# Patient Record
Sex: MALE | Race: WHITE | NOT HISPANIC OR LATINO | Employment: FULL TIME | URBAN - METROPOLITAN AREA
[De-identification: names, ages, dates, MRNs, and addresses within clinical notes are randomized per-mention and may not be internally consistent; named-entity substitution may affect disease eponyms.]

---

## 2017-01-30 ENCOUNTER — OFFICE VISIT (OUTPATIENT)
Dept: FAMILY MEDICINE CLINIC | Facility: CLINIC | Age: 32
End: 2017-01-30

## 2017-01-30 VITALS
DIASTOLIC BLOOD PRESSURE: 70 MMHG | SYSTOLIC BLOOD PRESSURE: 110 MMHG | HEART RATE: 83 BPM | OXYGEN SATURATION: 98 % | WEIGHT: 182 LBS | HEIGHT: 74 IN | TEMPERATURE: 98 F | BODY MASS INDEX: 23.36 KG/M2

## 2017-01-30 DIAGNOSIS — F41.9 ANXIETY: ICD-10-CM

## 2017-01-30 DIAGNOSIS — Z83.79 FH: CROHN'S DISEASE: ICD-10-CM

## 2017-01-30 DIAGNOSIS — I25.10 ASCVD (ARTERIOSCLEROTIC CARDIOVASCULAR DISEASE): ICD-10-CM

## 2017-01-30 DIAGNOSIS — J30.1 NON-SEASONAL ALLERGIC RHINITIS DUE TO POLLEN: ICD-10-CM

## 2017-01-30 DIAGNOSIS — R53.83 OTHER FATIGUE: Primary | ICD-10-CM

## 2017-01-30 PROBLEM — Z00.00 PREVENTATIVE HEALTH CARE: Status: ACTIVE | Noted: 2017-01-30

## 2017-01-30 PROBLEM — J30.9 ALLERGIC RHINITIS: Status: ACTIVE | Noted: 2017-01-30

## 2017-01-30 PROBLEM — L70.0 ACNE VULGARIS: Status: ACTIVE | Noted: 2017-01-30

## 2017-01-30 PROBLEM — N39.0 UTI (URINARY TRACT INFECTION): Status: ACTIVE | Noted: 2017-01-30

## 2017-01-30 PROCEDURE — 99213 OFFICE O/P EST LOW 20 MIN: CPT | Performed by: INTERNAL MEDICINE

## 2017-01-30 RX ORDER — MULTIPLE VITAMINS W/ MINERALS TAB 9MG-400MCG
1 TAB ORAL DAILY
COMMUNITY

## 2017-01-31 ENCOUNTER — RESULTS ENCOUNTER (OUTPATIENT)
Dept: FAMILY MEDICINE CLINIC | Facility: CLINIC | Age: 32
End: 2017-01-31

## 2017-01-31 DIAGNOSIS — Z83.79 FH: CROHN'S DISEASE: ICD-10-CM

## 2017-01-31 DIAGNOSIS — F41.9 ANXIETY: ICD-10-CM

## 2017-01-31 DIAGNOSIS — I25.10 ASCVD (ARTERIOSCLEROTIC CARDIOVASCULAR DISEASE): ICD-10-CM

## 2017-01-31 DIAGNOSIS — R53.83 OTHER FATIGUE: ICD-10-CM

## 2017-01-31 DIAGNOSIS — J30.1 NON-SEASONAL ALLERGIC RHINITIS DUE TO POLLEN: ICD-10-CM

## 2017-08-28 ENCOUNTER — OFFICE VISIT (OUTPATIENT)
Dept: INTERNAL MEDICINE | Facility: CLINIC | Age: 32
End: 2017-08-28

## 2017-08-28 VITALS
HEIGHT: 74 IN | SYSTOLIC BLOOD PRESSURE: 112 MMHG | TEMPERATURE: 98.1 F | WEIGHT: 183.3 LBS | OXYGEN SATURATION: 98 % | HEART RATE: 91 BPM | DIASTOLIC BLOOD PRESSURE: 70 MMHG | BODY MASS INDEX: 23.52 KG/M2

## 2017-08-28 DIAGNOSIS — Z00.00 PREVENTATIVE HEALTH CARE: Primary | ICD-10-CM

## 2017-08-28 PROCEDURE — 99395 PREV VISIT EST AGE 18-39: CPT | Performed by: FAMILY MEDICINE

## 2017-08-28 NOTE — PROGRESS NOTES
Subjective   Kurt Bonilla is a 32 y.o. male.     Chief Complaint   Patient presents with   • transferring from Dr. Siegel   • Annual Exam         History of Present Illness   Kurt Bonilla 32 y.o. male who presents for an Annual Wellness Visit.  he has a history of   Patient Active Problem List   Diagnosis   • Fatigue   • Acne vulgaris   • Allergic rhinitis   • Anxiety   • FH: Crohn's disease   • Preventative health care   • UTI (urinary tract infection)   • ASCVD 10 yr risk <1%   .  he has been feeling well.  Labs results discussed in detail with the patient.  Plan to update vaccines if needed today.  I  reviewed health maintenance with him as part of my preventative care plan.    Health Habits:  Dental Exam. up to date  Eye Exam. unknown  Exercise: 4 times/week.  Current exercise activities include: walking      The following portions of the patient's history were reviewed and updated as appropriate: allergies, current medications, past family history, past medical history, past social history, past surgical history and problem list.    Review of Systems   Constitutional: Negative for appetite change, fever and unexpected weight change.   HENT: Negative for ear pain, facial swelling and sore throat.    Eyes: Negative for pain and visual disturbance.   Respiratory: Negative for chest tightness, shortness of breath and wheezing.    Cardiovascular: Negative for chest pain and palpitations.   Gastrointestinal: Negative for abdominal pain and blood in stool.   Endocrine: Negative.    Genitourinary: Negative for difficulty urinating and hematuria.   Musculoskeletal: Negative for joint swelling.   Neurological: Negative for tremors, seizures and syncope.   Hematological: Negative for adenopathy.   Psychiatric/Behavioral: Negative.        Objective   Physical Exam   Constitutional: He is oriented to person, place, and time. He appears well-developed and well-nourished. No distress.   HENT:   Head: Normocephalic and  atraumatic.   Right Ear: External ear normal.   Left Ear: External ear normal.   Nose: Nose normal.   Mouth/Throat: Oropharynx is clear and moist.   Eyes: Conjunctivae and EOM are normal. Pupils are equal, round, and reactive to light. Right eye exhibits no discharge. Left eye exhibits no discharge. No scleral icterus.   Neck: Normal range of motion. Neck supple. No tracheal deviation present. No thyromegaly present.   Cardiovascular: Normal rate, regular rhythm, normal heart sounds, intact distal pulses and normal pulses.  Exam reveals no gallop.    No murmur heard.  Pulmonary/Chest: Effort normal and breath sounds normal. No respiratory distress. He has no wheezes. He has no rales.   Abdominal: Soft. Bowel sounds are normal. He exhibits no distension. There is no tenderness.   Musculoskeletal: Normal range of motion.   Neurological: He is alert and oriented to person, place, and time. He exhibits normal muscle tone. Coordination normal.   Skin: Skin is warm. No rash noted. No erythema. No pallor.   Psychiatric: He has a normal mood and affect. His behavior is normal. Judgment and thought content normal.   Nursing note and vitals reviewed.      Assessment/Plan   Kurt was seen today for transferring from dr. garland and annual exam.    Diagnoses and all orders for this visit:    Preventative health care      Continue healthy lifestyle with diet and exercise follow-up as needed or annually immunizations are up-to-date

## 2025-04-24 ENCOUNTER — HOSPITAL ENCOUNTER (EMERGENCY)
Facility: HOSPITAL | Age: 40
Discharge: HOME | End: 2025-04-25
Attending: STUDENT IN AN ORGANIZED HEALTH CARE EDUCATION/TRAINING PROGRAM
Payer: COMMERCIAL

## 2025-04-24 DIAGNOSIS — I83.892 BLEEDING FROM VARICOSE VEINS OF LEFT LOWER EXTREMITY: Primary | ICD-10-CM

## 2025-04-24 PROCEDURE — 80053 COMPREHEN METABOLIC PANEL: CPT | Performed by: STUDENT IN AN ORGANIZED HEALTH CARE EDUCATION/TRAINING PROGRAM

## 2025-04-24 PROCEDURE — 36415 COLL VENOUS BLD VENIPUNCTURE: CPT

## 2025-04-24 PROCEDURE — 85730 THROMBOPLASTIN TIME PARTIAL: CPT | Performed by: STUDENT IN AN ORGANIZED HEALTH CARE EDUCATION/TRAINING PROGRAM

## 2025-04-24 PROCEDURE — 85025 COMPLETE CBC W/AUTO DIFF WBC: CPT | Performed by: STUDENT IN AN ORGANIZED HEALTH CARE EDUCATION/TRAINING PROGRAM

## 2025-04-24 PROCEDURE — 85610 PROTHROMBIN TIME: CPT | Performed by: STUDENT IN AN ORGANIZED HEALTH CARE EDUCATION/TRAINING PROGRAM

## 2025-04-24 PROCEDURE — 99283 EMERGENCY DEPT VISIT LOW MDM: CPT

## 2025-04-24 RX ORDER — TRANEXAMIC ACID 100 MG/ML
500 INJECTION, SOLUTION INTRAVENOUS ONCE
Status: COMPLETED | OUTPATIENT
Start: 2025-04-24 | End: 2025-04-25

## 2025-04-25 VITALS
WEIGHT: 185 LBS | DIASTOLIC BLOOD PRESSURE: 89 MMHG | HEART RATE: 108 BPM | TEMPERATURE: 98.1 F | SYSTOLIC BLOOD PRESSURE: 130 MMHG | RESPIRATION RATE: 16 BRPM | BODY MASS INDEX: 23.74 KG/M2 | OXYGEN SATURATION: 96 % | HEIGHT: 74 IN

## 2025-04-25 LAB
ALBUMIN SERPL-MCNC: 3.4 G/DL (ref 3.5–5.2)
ALBUMIN/GLOB SERPL: 0.9 G/DL
ALP SERPL-CCNC: 63 U/L (ref 39–117)
ALT SERPL W P-5'-P-CCNC: 19 U/L (ref 1–41)
ANION GAP SERPL CALCULATED.3IONS-SCNC: 7.8 MMOL/L (ref 5–15)
APTT PPP: 28.8 SECONDS (ref 22.7–35.4)
AST SERPL-CCNC: 17 U/L (ref 1–40)
BASOPHILS # BLD AUTO: 0.07 10*3/MM3 (ref 0–0.2)
BASOPHILS NFR BLD AUTO: 0.4 % (ref 0–1.5)
BILIRUB SERPL-MCNC: 0.4 MG/DL (ref 0–1.2)
BUN SERPL-MCNC: 10 MG/DL (ref 6–20)
BUN/CREAT SERPL: 10.6 (ref 7–25)
CALCIUM SPEC-SCNC: 8.5 MG/DL (ref 8.6–10.5)
CHLORIDE SERPL-SCNC: 103 MMOL/L (ref 98–107)
CO2 SERPL-SCNC: 27.2 MMOL/L (ref 22–29)
CREAT SERPL-MCNC: 0.94 MG/DL (ref 0.76–1.27)
DEPRECATED RDW RBC AUTO: 37.3 FL (ref 37–54)
EGFRCR SERPLBLD CKD-EPI 2021: 105.8 ML/MIN/1.73
EOSINOPHIL # BLD AUTO: 0.09 10*3/MM3 (ref 0–0.4)
EOSINOPHIL NFR BLD AUTO: 0.5 % (ref 0.3–6.2)
ERYTHROCYTE [DISTWIDTH] IN BLOOD BY AUTOMATED COUNT: 11.9 % (ref 12.3–15.4)
GLOBULIN UR ELPH-MCNC: 3.8 GM/DL
GLUCOSE SERPL-MCNC: 178 MG/DL (ref 65–99)
HCT VFR BLD AUTO: 35.9 % (ref 37.5–51)
HGB BLD-MCNC: 12 G/DL (ref 13–17.7)
IMM GRANULOCYTES # BLD AUTO: 0.43 10*3/MM3 (ref 0–0.05)
IMM GRANULOCYTES NFR BLD AUTO: 2.4 % (ref 0–0.5)
INR PPP: 1.38 (ref 0.9–1.1)
LYMPHOCYTES # BLD AUTO: 1.63 10*3/MM3 (ref 0.7–3.1)
LYMPHOCYTES NFR BLD AUTO: 9.2 % (ref 19.6–45.3)
MCH RBC QN AUTO: 28.7 PG (ref 26.6–33)
MCHC RBC AUTO-ENTMCNC: 33.4 G/DL (ref 31.5–35.7)
MCV RBC AUTO: 85.9 FL (ref 79–97)
MONOCYTES # BLD AUTO: 1.09 10*3/MM3 (ref 0.1–0.9)
MONOCYTES NFR BLD AUTO: 6.2 % (ref 5–12)
NEUTROPHILS NFR BLD AUTO: 14.4 10*3/MM3 (ref 1.7–7)
NEUTROPHILS NFR BLD AUTO: 81.3 % (ref 42.7–76)
NRBC BLD AUTO-RTO: 0 /100 WBC (ref 0–0.2)
PLATELET # BLD AUTO: 265 10*3/MM3 (ref 140–450)
PMV BLD AUTO: 8.8 FL (ref 6–12)
POTASSIUM SERPL-SCNC: 3.8 MMOL/L (ref 3.5–5.2)
PROT SERPL-MCNC: 7.2 G/DL (ref 6–8.5)
PROTHROMBIN TIME: 17 SECONDS (ref 11.7–14.2)
RBC # BLD AUTO: 4.18 10*6/MM3 (ref 4.14–5.8)
SODIUM SERPL-SCNC: 138 MMOL/L (ref 136–145)
WBC NRBC COR # BLD AUTO: 17.71 10*3/MM3 (ref 3.4–10.8)

## 2025-04-25 RX ADMIN — TRANEXAMIC ACID 500 MG: 100 INJECTION INTRAVENOUS at 00:14

## 2025-04-25 NOTE — DISCHARGE INSTRUCTIONS
Please follow-up with your PCP.    Rest, ice, elevate to reduce swelling.  SUTURES TO BE REMOVED IN 7 DAYS BY YOUR PRIMARY CARE PROVIDER OR URGENT CARE.  Keep dressing in place and keep area clean and dry for 24-48 hours (face wounds can be left uncovered). After this time, remove dressing, wash with antibacterial soap and water 2-3 times per day and apply vaseline or aquaphor..   Keep covered with gauze or bandaid.(face wounds can be left uncovered).  Follow up with primary care for further management and to have your blood pressure rechecked.   Return to ER for excessive pain, swelling, numbness/tingling, fever, chills, weakness, redness, red streaking, drainage, bleeding, decreased sensation, or other worsening/concerning symptoms.       Although you are being discharged in the ED today, I encouraged her to return for worsening symptoms.  Things can, and do, change such a treatment at home with medication may not be adequate.  Specifically I recommend returning for chest pain or discomfort, difficulty breathing, persistent vomiting or difficulty holding down liquids or medications, fever > 102.0 F,  or any other worsening or alarming symptoms.     You have been evaluated in the emergency department for your presenting symptoms and deemed appropriate for discharge home.  Understand that your health care does not end here today.  It is important that your primary care physician be made aware of your visit.  I recommend calling your primary care provider in the next 48 hours to arrange follow-up care.  Your primary care provider needs to review your treatment and recovery to ensure that no further treatment is necessary.  Additional testing or procedures may be necessary as an outpatient at the discretion of your primary care provider.    I also recommend following up with your PCP for recheck of your blood pressure and treatment as needed.

## 2025-04-25 NOTE — ED PROVIDER NOTES
MD ATTESTATION NOTE    The [facility] and I have discussed this patient's history, physical exam, MDM, and treatment plan.  I have reviewed the documentation and personally had a face to face interaction with the patient. I affirm the documentation and agree with the treatment and plan.  The attached note describes my personal findings.      I provided a substantive portion of the medical decision making.        Brief HPI: 39-year-old male, no significant past medical history, presents to the ED for evaluation of bleeding from left ankle.  States he injured it but he is not sure how.  It may have happened at work or while weightlifting.  States he was cleaning it when he suddenly began to have brisk bleeding from the area.    PHYSICAL EXAM  ED Triage Vitals [04/24/25 2319]   Temp Heart Rate Resp BP SpO2   98.1 °F (36.7 °C) (!) 135 16 140/94 98 %      Temp src Heart Rate Source Patient Position BP Location FiO2 (%)   -- -- -- -- --         GENERAL: no acute distress  HENT: nares patent  EYES: no scleral icterus  CV: regular rhythm, tachycardia  RESPIRATORY: normal effort  MUSCULOSKELETAL: no deformity, patient with ecchymosis to medial aspect of left ankle.  He has approximately 1 cm ulcerated wound with brisk venous bleed  NEURO: alert, moves all extremities, follows commands  PSYCH:  calm, cooperative  SKIN: warm, dry    Vital signs and nursing notes reviewed.    PROCEDURES  Laceration Repair    Date/Time: 4/25/2025 12:10 AM    Performed by: Leighton Barbour MD  Authorized by: Leighton Barbour MD    Consent:     Consent obtained:  Verbal  Laceration details:     Location:  Foot    Foot location:  L ankle    Length (cm):  1  Exploration:     Hemostasis obtained with: figure of 8 suture.  Skin repair:     Repair method:  Sutures    Suture size:  3-0    Suture material:  Nylon    Suture technique:  Figure eight    Number of sutures:  1  Approximation:     Approximation:  Close  Repair type:     Repair type:   Simple  Post-procedure details:     Dressing: Pressure dressing with TXA.    Procedure completion:  Tolerated well, no immediate complications        Medical Decision Making:  ED Course as of 04/26/25 1211   u Apr 24, 2025   6713 I discussed the case with Dr. Barbour and he agrees to evaluate the patient at the bedside.    [CC]   Fri Apr 25, 2025   0015 Hemoglobin(!): 12.0  previously 14, 8 years ago [CC]   0025 Wound was repaired with a figure-of-eight stitch. It is not actively bleeding.  Pressure dressing was placed with TXA soaked gauze. Will observe prior to discharge [CC]   0148 Patient has been observed for an extended period of time.  He has had no recurrence of bleeding. He has had a small drop in hbg but I do not have anything recent to compare too.  I removed the pressure dressing and patient ambulated here in the ED with no signs of active bleeding.  Will replace dressing and wound care instructions were discussed, return precautions were given.  Patient will be discharged with outpatient follow-up. [CC]      ED Course User Index  [CC] Juana Kaufman PA-C       Patient with persistent brisk bleed to medial aspect left ankle.  Figure-of-eight suture x 1 placed with hemostasis.  Discussed need to follow-up with primary care provider for suture removal in 1 to 2 weeks         Leighton Barbour MD  04/25/25 0022       Leighton Barbour MD  04/25/25 0313       Leighton Barbour MD  04/26/25 1211

## 2025-04-25 NOTE — ED TRIAGE NOTES
To ER via EMS from home.  C/o bleeding from varicose vein left lower leg/ankle.   Significant blood loss per EMS.  Coban pressure dressing in place at this time with control of bleeding.

## 2025-04-25 NOTE — ED PROVIDER NOTES
EMERGENCY DEPARTMENT ENCOUNTER  Room Number:  07/07  PCP: Mark Engle MD  Independent Historians: Patient      HPI:  Chief Complaint: had concerns including Wound Check.     A complete HPI/ROS/PMH/PSH/SH/FH are unobtainable due to: None    Chronic or social conditions impacting patient care (Social Determinants of Health): None      Context: Kurt Bonilla is a 39 y.o. male presents emergency department complaining of bleeding from a wound on the left ankle.  Patient reports he does not know how he sustained the wound on the ankle originally but says it has been there about a week.  He denies that it has been painful and denies it has been draining or appeared infected.  He says tonight he must of bumped it on something because it started bleeding profusely.  He reports it was a lot of blood at home.  He is not anticoagulated.  It is currently wrapped in a pressure dressing.  He denies numbness or tingling in the foot.  He denies dizziness or lightheadedness.      Review of prior external notes (non-ED) -and- Review of prior external test results outside of this encounter:    I reviewed labs from 10/15/2016, hemoglobin 14.8, creatinine 0.85      PAST MEDICAL HISTORY  Active Ambulatory Problems     Diagnosis Date Noted    Fatigue 01/30/2017    Acne vulgaris 01/30/2017    Allergic rhinitis 01/30/2017    Anxiety 01/30/2017    FH: Crohn's disease 01/30/2017    Preventative health care 01/30/2017    UTI (urinary tract infection) 01/30/2017    ASCVD 10 yr risk <1% 01/30/2017     Resolved Ambulatory Problems     Diagnosis Date Noted    No Resolved Ambulatory Problems     No Additional Past Medical History         PAST SURGICAL HISTORY  Past Surgical History:   Procedure Laterality Date    CYST REMOVAL Right 1998    Right hand cyst removed         FAMILY HISTORY  Family History   Problem Relation Age of Onset    Heart disease Father     Heart attack Father 58        MI    Liver cancer Father 63    Pancreatic cancer  Father     Crohn's disease Father     Gout Father     Nephrolithiasis Father     Osteoporosis Mother     Lung cancer Maternal Grandfather          SOCIAL HISTORY  Social History     Socioeconomic History    Marital status: Single   Tobacco Use    Smoking status: Never    Smokeless tobacco: Never   Substance and Sexual Activity    Alcohol use: No    Drug use: No    Sexual activity: Defer         ALLERGIES  Keflex [cephalexin] and Phenobarbital      REVIEW OF SYSTEMS  Included in HPI  All systems reviewed and negative except for those discussed in HPI.      PHYSICAL EXAM    I have reviewed the triage vital signs and nursing notes.    ED Triage Vitals [04/24/25 2319]   Temp Heart Rate Resp BP SpO2   98.1 °F (36.7 °C) (!) 135 16 140/94 98 %      Temp src Heart Rate Source Patient Position BP Location FiO2 (%)   -- -- -- -- --       Physical Exam  Constitutional:       Appearance: Normal appearance.   HENT:      Head: Normocephalic and atraumatic.      Mouth/Throat:      Mouth: Mucous membranes are moist.   Eyes:      Extraocular Movements: Extraocular movements intact.      Pupils: Pupils are equal, round, and reactive to light.   Cardiovascular:      Rate and Rhythm: Normal rate and regular rhythm.      Pulses: Normal pulses.      Heart sounds: Normal heart sounds.   Pulmonary:      Effort: Pulmonary effort is normal. No respiratory distress.      Breath sounds: Normal breath sounds.   Musculoskeletal:         General: Normal range of motion.      Cervical back: Normal range of motion and neck supple.   Skin:     General: Skin is warm and dry.      Capillary Refill: Capillary refill takes less than 2 seconds.      Comments: About a dime sized circular wound on the medial malleolus that is copiously bleeding.  No arterial bleeding.  Patient does have some varicosities on the bilateral lower extremities which I suspect to be the source.  2+ DP pulse.  Light touch grossly intact distally.   Neurological:      General: No  focal deficit present.      Mental Status: He is alert and oriented to person, place, and time.   Psychiatric:         Mood and Affect: Mood normal.         Behavior: Behavior normal.         LAB RESULTS  Recent Results (from the past 24 hours)   Comprehensive Metabolic Panel    Collection Time: 04/24/25 11:49 PM    Specimen: Arm, Right; Blood   Result Value Ref Range    Glucose 178 (H) 65 - 99 mg/dL    BUN 10 6 - 20 mg/dL    Creatinine 0.94 0.76 - 1.27 mg/dL    Sodium 138 136 - 145 mmol/L    Potassium 3.8 3.5 - 5.2 mmol/L    Chloride 103 98 - 107 mmol/L    CO2 27.2 22.0 - 29.0 mmol/L    Calcium 8.5 (L) 8.6 - 10.5 mg/dL    Total Protein 7.2 6.0 - 8.5 g/dL    Albumin 3.4 (L) 3.5 - 5.2 g/dL    ALT (SGPT) 19 1 - 41 U/L    AST (SGOT) 17 1 - 40 U/L    Alkaline Phosphatase 63 39 - 117 U/L    Total Bilirubin 0.4 0.0 - 1.2 mg/dL    Globulin 3.8 gm/dL    A/G Ratio 0.9 g/dL    BUN/Creatinine Ratio 10.6 7.0 - 25.0    Anion Gap 7.8 5.0 - 15.0 mmol/L    eGFR 105.8 >60.0 mL/min/1.73   Protime-INR    Collection Time: 04/24/25 11:49 PM    Specimen: Arm, Right; Blood   Result Value Ref Range    Protime 17.0 (H) 11.7 - 14.2 Seconds    INR 1.38 (H) 0.90 - 1.10   aPTT    Collection Time: 04/24/25 11:49 PM    Specimen: Arm, Right; Blood   Result Value Ref Range    PTT 28.8 22.7 - 35.4 seconds   CBC Auto Differential    Collection Time: 04/24/25 11:49 PM    Specimen: Arm, Right; Blood   Result Value Ref Range    WBC 17.71 (H) 3.40 - 10.80 10*3/mm3    RBC 4.18 4.14 - 5.80 10*6/mm3    Hemoglobin 12.0 (L) 13.0 - 17.7 g/dL    Hematocrit 35.9 (L) 37.5 - 51.0 %    MCV 85.9 79.0 - 97.0 fL    MCH 28.7 26.6 - 33.0 pg    MCHC 33.4 31.5 - 35.7 g/dL    RDW 11.9 (L) 12.3 - 15.4 %    RDW-SD 37.3 37.0 - 54.0 fl    MPV 8.8 6.0 - 12.0 fL    Platelets 265 140 - 450 10*3/mm3    Neutrophil % 81.3 (H) 42.7 - 76.0 %    Lymphocyte % 9.2 (L) 19.6 - 45.3 %    Monocyte % 6.2 5.0 - 12.0 %    Eosinophil % 0.5 0.3 - 6.2 %    Basophil % 0.4 0.0 - 1.5 %    Immature  Grans % 2.4 (H) 0.0 - 0.5 %    Neutrophils, Absolute 14.40 (H) 1.70 - 7.00 10*3/mm3    Lymphocytes, Absolute 1.63 0.70 - 3.10 10*3/mm3    Monocytes, Absolute 1.09 (H) 0.10 - 0.90 10*3/mm3    Eosinophils, Absolute 0.09 0.00 - 0.40 10*3/mm3    Basophils, Absolute 0.07 0.00 - 0.20 10*3/mm3    Immature Grans, Absolute 0.43 (H) 0.00 - 0.05 10*3/mm3    nRBC 0.0 0.0 - 0.2 /100 WBC         MEDICATIONS GIVEN IN ER  Medications   tranexamic acid 500 MG/5ML (ED/Crit Care for lacerations) - VIAL DISPENSE 500 mg (500 mg Topical Given by Other 4/25/25 0014)         OUTPATIENT MEDICATION MANAGEMENT:  No current Epic-ordered facility-administered medications on file.     Current Outpatient Medications Ordered in Epic   Medication Sig Dispense Refill    fexofenadine (ALLEGRA) 180 MG tablet Take 180 mg by mouth Daily.      Multiple Vitamins-Minerals (MULTIVITAMIN WITH MINERALS) tablet tablet Take 1 tablet by mouth Daily.           PROGRESS, DATA ANALYSIS, CONSULTS, AND MEDICAL DECISION MAKING  ORDERS PLACED DURING THIS VISIT:  Orders Placed This Encounter   Procedures    Laceration Repair    Comprehensive Metabolic Panel    Protime-INR    aPTT    CBC Auto Differential    CBC & Differential       All labs have been independently interpreted by me.  All radiology studies have been reviewed by me. All EKG's have been independently viewed and interpreted by me.  Discussion below represents my analysis of pertinent findings related to patient's condition, differential diagnosis, treatment plan and final disposition.    Differential diagnosis includes but is not limited to:   Varicose vein bleeding, arterial bleeding    ED Course:  ED Course as of 04/25/25 0403   u Apr 24, 2025   9467 I discussed the case with Dr. Barbour and he agrees to evaluate the patient at the bedside.    [CC]   Fri Apr 25, 2025   0015 Hemoglobin(!): 12.0  previously 14, 8 years ago [CC]   0025 Wound was repaired with a figure-of-eight stitch. It is not actively  bleeding.  Pressure dressing was placed with TXA soaked gauze. Will observe prior to discharge [CC]   0148 Patient has been observed for an extended period of time.  He has had no recurrence of bleeding. He has had a small drop in hbg but I do not have anything recent to compare too.  I removed the pressure dressing and patient ambulated here in the ED with no signs of active bleeding.  Will replace dressing and wound care instructions were discussed, return precautions were given.  Patient will be discharged with outpatient follow-up. [CC]      ED Course User Index  [CC] Juana Kaufman PA-C           AS OF 04:03 EDT VITALS:    BP - 130/89  HR - 108  TEMP - 98.1 °F (36.7 °C)  O2 SATS - 96%          DIAGNOSIS  Final diagnoses:   Bleeding from varicose veins of left lower extremity         DISPOSITION  ED Disposition       ED Disposition   Discharge    Condition   Stable    Comment   --                      Please note that portions of this document were completed with a voice recognition program.    Note Disclaimer: At [facility], we believe that sharing information builds trust and better relationships. You are receiving this note because you recently visited [facility]. It is possible you will see health information before a provider has talked with you about it. This kind of information can be easy to misunderstand. To help you fully understand what it means for your health, we urge you to discuss this note with your provider.     Juana Kaufman PA-C  04/25/25 1319

## 2025-04-28 ENCOUNTER — NURSE TRIAGE (OUTPATIENT)
Dept: CALL CENTER | Facility: HOSPITAL | Age: 40
End: 2025-04-28
Payer: COMMERCIAL

## 2025-04-28 NOTE — TELEPHONE ENCOUNTER
Call transferred form the Hub. Caller states that her son was seen in the ED last Friday for a varicose vein bleed and received one stitch. This afternoon after a shower it there was a small amt of fresh blood. They covered with a band-aid. They have an apt with Dr. Engle to have the stitch removed Friday. Not sure if they should be seen sooner. Pt is having no pain. Bleeding did not come through the dressing. No s/s of infection. Caller encouraged to keep Friday apt and to call back or seek urgent care for bleeding that cannot be stopped or s/s of infection. They verbalize understanding  Reason for Disposition   Small cut (scratch) or abrasion (scrape) is also present    Additional Information   Negative: Serious injury with multiple fractures (broken bones)   Negative: [1] Major bleeding (e.g., actively dripping or spurting) AND [2] can't be stopped   Negative: Bullet wound, stabbed by knife, or other serious penetrating wound   Negative: Looks like a dislocated joint (crooked or deformed)   Negative: Can't stand (bear weight) or walk   Negative: Sounds like a life-threatening emergency to the triager   Negative: Wound looks infected   Negative: Leg pain from overuse (e.g., sports, running, physical work)   Negative: Leg pain not from an injury   Negative: Injury mainly to the hip   Negative: Injury mainly to knee   Negative: Injury mainly to foot or ankle   Negative: Skin is split open or gaping (or length > 1/2 inch or 12 mm)   Negative: [1] Bleeding AND [2] won't stop after 10 minutes of direct pressure (using correct technique)   Negative: [1] Dirt in the wound AND [2] not removed with 15 minutes of scrubbing   Negative: Sounds like a serious injury to the triager   Negative: [1] SEVERE pain (e.g., excruciating pain, unable to do any normal activities)  AND [2] not improved 2 hours after pain medicine/ice packs   Negative: Suspicious history for the injury   Negative: Large swelling or bruise > 2 inches (5  "cm)   Negative: [1] High-risk adult (e.g., age > 60 years, osteoporosis, chronic steroid use) AND [2] limping   Negative: [1] No prior tetanus shots (or is not fully vaccinated) AND [2] any wound (e.g., cut, scrape)   Negative: [1] HIV positive or severe immunodeficiency (severely weak immune system) AND [2] DIRTY cut or scrape   Negative: [1] Last tetanus shot > 5 years ago AND [2] DIRTY cut or scrape   Negative: [1] Last tetanus shot > 10 years ago AND [2] CLEAN cut or scrape (e.g., object AND skin were clean)   Negative: [1] After 3 days AND [2] still limping   Negative: [1] After 3 days AND [2] pain not improved   Negative: [1] After 2 weeks AND [2] still painful or swollen   Negative: [1] Minor injury or pain from twisting or over-stretching AND [2] walking normally   Negative: Minor injury or pain from direct blow    Answer Assessment - Initial Assessment Questions  1. MECHANISM: \"How did the injury happen?\" (e.g., twisting injury, direct blow)       Was seen in the ED for varicose vein bleeding had to be stitched  2. ONSET: \"When did the injury happen?\" (Minutes or hours ago)       Last Friday initial injury, was bleeding today again after a shower  3. LOCATION: \"Where is the injury located?\"       Left ankle  4. APPEARANCE of INJURY: \"What does the injury look like?\"  (e.g., deformity of leg)      no  5. SEVERITY: \"Can you put weight on that leg?\" \"Can you walk?\"       Yes he can walk  6. SIZE: For cuts, bruises, or swelling, ask: \"How large is it?\" (e.g., inches or centimeters)       small  7. PAIN: \"Is there pain?\" If Yes, ask: \"How bad is the pain?\"   \"What does it keep you from doing?\" (e.g., Scale 1-10; or mild, moderate, severe)    -  NONE: (0): no pain    -  MILD (1-3): doesn't interfere with normal activities     -  MODERATE (4-7): interferes with normal activities (e.g., work or school) or awakens from sleep, limping     -  SEVERE (8-10): excruciating pain, unable to do any normal activities, unable " "to walk      no  8. TETANUS: For any breaks in the skin, ask: \"When was the last tetanus booster?\"      na  9. OTHER SYMPTOMS: \"Do you have any other symptoms?\"       no  10. PREGNANCY: \"Is there any chance you are pregnant?\" \"When was your last menstrual period?\"        no    Protocols used: Leg Injury-ADULT-AH    "

## 2025-05-02 ENCOUNTER — TELEPHONE (OUTPATIENT)
Dept: INTERNAL MEDICINE | Facility: CLINIC | Age: 40
End: 2025-05-02

## 2025-05-02 ENCOUNTER — OFFICE VISIT (OUTPATIENT)
Dept: INTERNAL MEDICINE | Facility: CLINIC | Age: 40
End: 2025-05-02
Payer: COMMERCIAL

## 2025-05-02 VITALS
BODY MASS INDEX: 23.49 KG/M2 | SYSTOLIC BLOOD PRESSURE: 140 MMHG | WEIGHT: 183 LBS | HEART RATE: 131 BPM | DIASTOLIC BLOOD PRESSURE: 102 MMHG | TEMPERATURE: 98.5 F | HEIGHT: 74 IN | OXYGEN SATURATION: 99 %

## 2025-05-02 DIAGNOSIS — I83.023 VENOUS STASIS ULCER OF LEFT ANKLE WITH FAT LAYER EXPOSED WITH VARICOSE VEINS: ICD-10-CM

## 2025-05-02 DIAGNOSIS — F41.9 ANXIETY: Primary | ICD-10-CM

## 2025-05-02 DIAGNOSIS — L97.322 VENOUS STASIS ULCER OF LEFT ANKLE WITH FAT LAYER EXPOSED WITH VARICOSE VEINS: ICD-10-CM

## 2025-05-02 PROBLEM — L97.922 LEG ULCER, LEFT, WITH FAT LAYER EXPOSED: Status: ACTIVE | Noted: 2025-05-02

## 2025-05-02 NOTE — TELEPHONE ENCOUNTER
PATIENT'S MOTHER CALLED TO SEE IF THERE IS ANOTHER PLACE TO GET PRESCRIPTION COMPRESSION SOCKS THAN [facility].    PLEASE CALL [phone]

## 2025-05-02 NOTE — PROGRESS NOTES
"Chief Complaint  ER follow up  (Varicose Vein Bleed )    Subjective        Kurt Bonilla presents to [facility]  History of Present Illness  Patient appointment for follow-up after seen at  4 suture placed in ankle due to profuse bleeding of vein with known varicose veins  Patient has discoloration consistent with venous stasis left ankle right ankle clear some associated varicose veins as well  Found to have elevated white blood cell count at ER visit no fever sweats or chills no abdominal pain no change in bowel or bladder habits he is fairly anxious is  Objective   Vital Signs:  BP (!) 140/102   Pulse (!) 131   Temp 98.5 °F (36.9 °C)   Ht 188 cm (74\")   Wt 83 kg (183 lb)   SpO2 99%   BMI 23.50 kg/m²   Estimated body mass index is 23.5 kg/m² as calculated from the following:    Height as of this encounter: 188 cm (74\").    Weight as of this encounter: 83 kg (183 lb).    BMI is within normal parameters. No other follow-up for BMI required.      Physical Exam  Vitals and nursing note reviewed.   Constitutional:       Appearance: He is not ill-appearing.   HENT:      Head: Normocephalic and atraumatic.   Cardiovascular:      Rate and Rhythm: Normal rate and regular rhythm.      Pulses: Normal pulses.      Heart sounds: Normal heart sounds.   Pulmonary:      Effort: Pulmonary effort is normal.      Breath sounds: Normal breath sounds.   Musculoskeletal:         General: Signs of injury present.      Right lower leg: No edema.      Left lower leg: No edema.        Feet:    Feet:      Comments: Wound  Lymphadenopathy:      Cervical: No cervical adenopathy.   Skin:     General: Skin is warm and dry.      Comments: Left venous stasis ulcer   Neurological:      Mental Status: He is alert.        Result Review :    CBC          4/24/2025    23:49   CBC   WBC 17.71    RBC 4.18    Hemoglobin 12.0    Hematocrit 35.9    MCV 85.9    MCH 28.7    MCHC 33.4    RDW 11.9    Platelets 265  "               Assessment and Plan   Diagnoses and all orders for this visit:    1. Anxiety (Primary)    2. Venous stasis ulcer of left ankle with fat layer exposed with varicose veins  -     Ambulatory Referral to Wound Clinic    Recommend off work 5 days to allow for healing and treatment through wound clinic and suture evaluation  Instructions given for daily dressing changes leave open to air while off of work  Recommend compression sock 15 to 20 mmHg  Suture still present       I spent 32 minutes caring for Kurt on this date of service. This time includes time spent by me in the following activities:preparing for the visit, reviewing tests, obtaining and/or reviewing a separately obtained history, performing a medically appropriate examination and/or evaluation , counseling and educating the patient/family/caregiver, documenting information in the medical record, and independently interpreting results and communicating that information with the patient/family/caregiver  Follow Up   Return in about 1 month (around 6/2/2025) for Recheck.  Patient was given instructions and counseling regarding his condition or for health maintenance advice. Please see specific information pulled into the AVS if appropriate.

## 2025-05-02 NOTE — TELEPHONE ENCOUNTER
Provider: DR TAYLOR    Caller: Tiffany Bonilla    Relationship to Patient: Mother    Phone Number: [phone]    Reason for Call: PATIENT'S MOTHER IS CALLING TO CHECK THE STATUS OF THE WOUND CARE REFERRAL.  SHE STATES SHE IS VERY CONCERNED AND WANTS HIM TO BE SEEN AS SOON AS POSSIBLE BY WOUND CARE.    PLEASE ADVISE.

## 2025-05-02 NOTE — TELEPHONE ENCOUNTER
Caller: Tiffany Bonilla    Relationship to patient: Mother    Best call back number: [phone]    Chief complaint: TIFFANY REQUESTING ADVISE FROM DR TAYLOR AND ONLY DR TAYLOR ABOUT TAKING PATIENT TO ER TO CHECK FOR DVT    Patient directed to call 911 or go to their nearest emergency room.     Patient verbalized understanding: [] Yes  [x] No  If no, why? SHE WANTED TO ONLY HEAR FROM DR TAYLOR IF SHE SHOULD TAKE SON TO ER.    Additional notes:TIFFANY REFUSED TO SPEAK WITH NTC.    ATTEMPTED WARM TRANSFER, UNSUCCESSFUL.

## 2025-05-02 NOTE — TELEPHONE ENCOUNTER
Told the patients mother stated that she was hoping that she would hear something soon about the referral. Told patients mother that it was marked as urgent . She should get a call soon. She stated that she understood.

## 2025-05-02 NOTE — TELEPHONE ENCOUNTER
Spoke to patients mother and let her know that [facility] is the only place. She stated that she was going to measure him herself and order off of [employer].

## 2025-05-02 NOTE — TELEPHONE ENCOUNTER
Spoke with patient and let her know that I spoke with Dr. Engle and he stated that its not necessary for patient to be seen to see if he has blood clots that its best to just let Wound Care take over and help address the issue so that he can get the best care. She stated that she understood and stated that she appreciated the prompt call back.

## 2025-05-02 NOTE — TELEPHONE ENCOUNTER
Caller: Tiffany Bonilla    Relationship: Mother    Best call back number: [phone]    What is the best time to reach you: ANY    Who are you requesting to speak with (clinical staff, provider,  specific staff member): CLINICAL    Do you know the name of the person who called: TIFFANY PATIENTS MOTHER    What was the call regarding: TIFFANY IS ASKING IF SHE SHOULD TAKE PATIENT TO ER TO HAVE HIM CHECKED FOR A BLOOD CLOT.    ATTEMPTED TO WARM TRANSFER, UNSUCCESSFUL.    TIFFANY REFUSED ER AND STATED ONLY WOULD TAKE THE ANSWER FROM DR TAYLOR.    TIFFANY REQUESTING TO HAVE ANSWER BEFORE END OF DAY TODAY.    Is it okay if the provider responds through MyChart: NO

## 2025-05-07 ENCOUNTER — OFFICE VISIT (OUTPATIENT)
Dept: WOUND CARE | Facility: HOSPITAL | Age: 40
End: 2025-05-07
Payer: COMMERCIAL

## 2025-05-07 ENCOUNTER — HOSPITAL ENCOUNTER (OUTPATIENT)
Dept: GENERAL RADIOLOGY | Facility: HOSPITAL | Age: 40
Discharge: HOME | End: 2025-05-07
Payer: COMMERCIAL

## 2025-05-07 ENCOUNTER — LAB REQUISITION (OUTPATIENT)
Dept: LAB | Facility: HOSPITAL | Age: 40
End: 2025-05-07
Payer: COMMERCIAL

## 2025-05-07 ENCOUNTER — TRANSCRIBE ORDERS (OUTPATIENT)
Dept: LAB | Facility: HOSPITAL | Age: 40
End: 2025-05-07
Payer: COMMERCIAL

## 2025-05-07 ENCOUNTER — LAB (OUTPATIENT)
Dept: LAB | Facility: HOSPITAL | Age: 40
End: 2025-05-07
Payer: COMMERCIAL

## 2025-05-07 DIAGNOSIS — I87.312 CHRONIC VENOUS HYPERTENSION (IDIOPATHIC) WITH ULCER OF LEFT LOWER EXTREMITY (CODE): ICD-10-CM

## 2025-05-07 DIAGNOSIS — I87.312 IDIOPATHIC CHRONIC VENOUS HYPERTENSION OF LEFT LOWER EXTREMITY WITH ULCER: Primary | ICD-10-CM

## 2025-05-07 DIAGNOSIS — L97.929 IDIOPATHIC CHRONIC VENOUS HYPERTENSION OF LEFT LOWER EXTREMITY WITH ULCER: ICD-10-CM

## 2025-05-07 DIAGNOSIS — I87.312 IDIOPATHIC CHRONIC VENOUS HYPERTENSION OF LEFT LOWER EXTREMITY WITH ULCER: ICD-10-CM

## 2025-05-07 DIAGNOSIS — L97.929 IDIOPATHIC CHRONIC VENOUS HYPERTENSION OF LEFT LOWER EXTREMITY WITH ULCER: Primary | ICD-10-CM

## 2025-05-07 DIAGNOSIS — S90.512A ABRASION, LEFT ANKLE, INITIAL ENCOUNTER: ICD-10-CM

## 2025-05-07 LAB
HBA1C MFR BLD: 5.4 % (ref 4.8–5.6)
PREALB SERPL-MCNC: 30.9 MG/DL (ref 20–40)

## 2025-05-07 PROCEDURE — 87147 CULTURE TYPE IMMUNOLOGIC: CPT | Performed by: NURSE PRACTITIONER

## 2025-05-07 PROCEDURE — 73610 X-RAY EXAM OF ANKLE: CPT

## 2025-05-07 PROCEDURE — 87075 CULTR BACTERIA EXCEPT BLOOD: CPT | Performed by: NURSE PRACTITIONER

## 2025-05-07 PROCEDURE — 87070 CULTURE OTHR SPECIMN AEROBIC: CPT | Performed by: NURSE PRACTITIONER

## 2025-05-07 PROCEDURE — 87186 SC STD MICRODIL/AGAR DIL: CPT | Performed by: NURSE PRACTITIONER

## 2025-05-07 PROCEDURE — 83036 HEMOGLOBIN GLYCOSYLATED A1C: CPT

## 2025-05-07 PROCEDURE — 87205 SMEAR GRAM STAIN: CPT | Performed by: NURSE PRACTITIONER

## 2025-05-07 PROCEDURE — 36415 COLL VENOUS BLD VENIPUNCTURE: CPT

## 2025-05-07 PROCEDURE — 84134 ASSAY OF PREALBUMIN: CPT

## 2025-05-07 PROCEDURE — 87015 SPECIMEN INFECT AGNT CONCNTJ: CPT | Performed by: NURSE PRACTITIONER

## 2025-05-10 LAB
BACTERIA SPEC AEROBE CULT: ABNORMAL
GRAM STN SPEC: ABNORMAL
GRAM STN SPEC: ABNORMAL

## 2025-05-14 ENCOUNTER — OFFICE VISIT (OUTPATIENT)
Dept: WOUND CARE | Facility: HOSPITAL | Age: 40
End: 2025-05-14
Payer: COMMERCIAL

## 2025-05-14 LAB — BACTERIA SPEC ANAEROBE CULT: NORMAL

## 2025-05-21 ENCOUNTER — OFFICE VISIT (OUTPATIENT)
Dept: WOUND CARE | Facility: HOSPITAL | Age: 40
End: 2025-05-21
Payer: COMMERCIAL

## 2025-06-04 ENCOUNTER — OFFICE VISIT (OUTPATIENT)
Dept: WOUND CARE | Facility: HOSPITAL | Age: 40
End: 2025-06-04
Payer: COMMERCIAL